# Patient Record
Sex: MALE | Race: WHITE | NOT HISPANIC OR LATINO | Employment: PART TIME | URBAN - METROPOLITAN AREA
[De-identification: names, ages, dates, MRNs, and addresses within clinical notes are randomized per-mention and may not be internally consistent; named-entity substitution may affect disease eponyms.]

---

## 2020-08-24 ENCOUNTER — AMBULATORY - HEALTHEAST (OUTPATIENT)
Dept: INTERNAL MEDICINE | Facility: CLINIC | Age: 25
End: 2020-08-24

## 2020-08-24 ENCOUNTER — VIRTUAL VISIT (OUTPATIENT)
Dept: FAMILY MEDICINE | Facility: OTHER | Age: 25
End: 2020-08-24
Payer: COMMERCIAL

## 2020-08-24 DIAGNOSIS — Z20.822 SUSPECTED 2019 NOVEL CORONAVIRUS INFECTION: ICD-10-CM

## 2020-08-24 PROCEDURE — 99421 OL DIG E/M SVC 5-10 MIN: CPT | Performed by: PHYSICIAN ASSISTANT

## 2020-08-24 NOTE — PROGRESS NOTES
"Date: 2020 12:05:39  Clinician: Milton Woods  Clinician NPI: [id number]  Patient: farhan peña  Patient : 1995  Patient Address: [address] [place] [address]  Patient Phone: [phone]  Visit Protocol: URI  Patient Summary:  farhan is a 25 year old ( : 1995 ) male who initiated a Visit for COVID-19 (Coronavirus) evaluation and screening. When asked the question \"Please sign me up to receive news, health information and promotions from [employer].\", farhan responded \"No\".    When asked when his symptoms started, farhan reported that he does not have any symptoms.   He denies having recent facial or sinus surgery in the past 60 days and taking antibiotic medication in the past month.    Pertinent COVID-19 (Coronavirus) information  In the past 14 days, farhan has not worked in a congregate living setting.   He does not work or volunteer as healthcare worker or a  and does not work or volunteer in a healthcare facility.   farhan also has not lived in a congregate living setting in the past 14 days. He does not live with a healthcare worker.   farhan has had a close contact with a laboratory-confirmed COVID-19 patient in the last 14 days. Additional information about contact with COVID-19 (Coronavirus) patient as reported by the patient (free text): Neli Stevenson lives in same house same room as myself and daughter everyday    Patient reported they are living in the same household with a COVID-19 positive patient.  Since 2019, farhan and has not had upper respiratory infection or influenza-like illness. Has not been diagnosed with lab-confirmed COVID-19 test   Pertinent medical history  farhan needs a return to work/school note.   Weight: 300 lbs   farhan does not smoke or use smokeless tobacco.   Weight: 300 lbs    MEDICATIONS: No current medications, ALLERGIES: NKDA  Clinician Response:  Dear farhan,   Your symptoms show that you may have coronavirus " "(COVID-19). This illness can cause fever, cough and trouble breathing. Many people get a mild case and get better on their own. Some people can get very sick.  What should I do?  We would like to test you for this virus.   1. Please call [phone] to schedule your visit. Explain that you were referred by [facility] to have a COVID-19 test. Be ready to share your [facility] visit ID number.  The following will serve as your written order for this COVID Test, ordered by me, for the indication of suspected COVID [Z20.828]: The test will be ordered in [email or web address], our electronic health record, after you are scheduled. It will show as ordered and authorized by Vik Haro MD.  Order: COVID-19 (Coronavirus) PCR for SYMPTOMATIC testing from [facility].      2. When it's time for your COVID test:  Stay at least 6 feet away from others. (If someone will drive you to your test, stay in the backseat, as far away from the  as you can.)   Cover your mouth and nose with a mask, tissue or washcloth.  Go straight to the testing site. Don't make any stops on the way there or back.      3.Starting now: Stay home and away from others (self-isolate) until:   You've had no fever---and no medicine that reduces fever---for one full day (24 hours). And...   Your other symptoms have gotten better. For example, your cough or breathing has improved. And...   At least 10 days have passed since your symptoms started.       During this time, don't leave the house except for testing or medical care.   Stay in your own room, even for meals. Use your own bathroom if you can.   Stay away from others in your home. No hugging, kissing or shaking hands. No visitors.  Don't go to work, school or anywhere else.    Clean \"high touch\" surfaces often (doorknobs, counters, handles, etc.). Use a household cleaning spray or wipes. You'll find a full list of  on the EPA website: www.epa.gov/pesticide-registration/list-n-disinfectants-use-against-sars-cov-2.   " Cover your mouth and nose with a mask, tissue or washcloth to avoid spreading germs.  Wash your hands and face often. Use soap and water.  Caregivers in these groups are at risk for severe illness due to COVID-19:  o People 65 years and older  o People who live in a nursing home or long-term care facility  o People with chronic disease (lung, heart, cancer, diabetes, kidney, liver, immunologic)  o People who have a weakened immune system, including those who:   Are in cancer treatment  Take medicine that weakens the immune system, such as corticosteroids  Had a bone marrow or organ transplant  Have an immune deficiency  Have poorly controlled HIV or AIDS  Are obese (body mass index of 40 or higher)  Smoke regularly   o Caregivers should wear gloves while washing dishes, handling laundry and cleaning bedrooms and bathrooms.  o Use caution when washing and drying laundry: Don't shake dirty laundry, and use the warmest water setting that you can.  o For more tips, go to www.cdc.gov/coronavirus/2019-ncov/downloads/10Things.pdf.    4.Sign up for [employer]. We know it's scary to hear that you might have COVID-19. We want to track your symptoms to make sure you're okay over the next 2 weeks. Please look for an email from [employer]---this is a free, online program that we'll use to keep in touch. To sign up, follow the link in the email. Learn more at [email or web address]  How can I take care of myself?   Get lots of rest. Drink extra fluids (unless a doctor has told you not to).   Take Tylenol (acetaminophen) for fever or pain. If you have liver or kidney problems, ask your family doctor if it's okay to take Tylenol.   Adults can take either:    650 mg (two 325 mg pills) every 4 to 6 hours, or...   1,000 mg (two 500 mg pills) every 8 hours as needed.    Note: Don't take more than 3,000 mg in one day. Acetaminophen is found in many medicines (both prescribed and over-the-counter medicines). Read all  labels to be sure you don't take too much.   For children, check the Tylenol bottle for the right dose. The dose is based on the child's age or weight.    If you have other health problems (like cancer, heart failure, an organ transplant or severe kidney disease): Call your specialty clinic if you don't feel better in the next 2 days.       Know when to call 911. Emergency warning signs include:    Trouble breathing or shortness of breath Pain or pressure in the chest that doesn't go away Feeling confused like you haven't felt before, or not being able to wake up Bluish-colored lips or face.  Where can I get more information?   [facility] -- About COVID-19: [email or web address]   CDC -- What to Do If You're Sick: www.cdc.gov/coronavirus/2019-ncov/about/steps-when-sick.html   [facility] -- Ending Home Isolation: www.cdc.gov/coronavirus/2019-ncov/hcp/disposition-in-home-patients.html   [facility] -- Caring for Someone: www.cdc.gov/coronavirus/2019-ncov/if-you-are-sick/care-for-someone.html   [facility] -- Interim Guidance for Hospital Discharge to Home: www.[facility].mn.us/diseases/coronavirus/hcp/hospdischarge.pdf   [facility] clinical trials (COVID-19 research studies): clinicalaffairs.[facility]-clinical-trials    Below are the COVID-19 hotlines at the [facility] ([facility]). Interpreters are available.    For health questions: Call 541-494-0356 or 1-131.199.4310 (7 a.m. to 7 p.m.) For questions about schools and childcare: Call 830-479-1906 or 1-485.816.2700 (7 a.m. to 7 p.m.)    Diagnosis: Contact with and (suspected) exposure to other viral communicable diseases  Diagnosis ICD: Z20.828

## 2020-08-25 ENCOUNTER — AMBULATORY - HEALTHEAST (OUTPATIENT)
Dept: FAMILY MEDICINE | Facility: CLINIC | Age: 25
End: 2020-08-25

## 2020-08-25 DIAGNOSIS — Z20.822 SUSPECTED 2019 NOVEL CORONAVIRUS INFECTION: ICD-10-CM

## 2020-08-27 ENCOUNTER — COMMUNICATION - HEALTHEAST (OUTPATIENT)
Dept: SCHEDULING | Facility: CLINIC | Age: 25
End: 2020-08-27

## 2021-08-15 ENCOUNTER — HEALTH MAINTENANCE LETTER (OUTPATIENT)
Age: 26
End: 2021-08-15

## 2021-10-11 ENCOUNTER — HEALTH MAINTENANCE LETTER (OUTPATIENT)
Age: 26
End: 2021-10-11

## 2022-06-13 ENCOUNTER — HOSPITAL ENCOUNTER (EMERGENCY)
Facility: CLINIC | Age: 27
Discharge: LEFT BEFORE DISCHARGE | End: 2022-06-13
Admitting: EMERGENCY MEDICINE
Payer: COMMERCIAL

## 2022-06-13 VITALS
WEIGHT: 300 LBS | HEART RATE: 66 BPM | TEMPERATURE: 97.8 F | SYSTOLIC BLOOD PRESSURE: 166 MMHG | BODY MASS INDEX: 36.53 KG/M2 | DIASTOLIC BLOOD PRESSURE: 105 MMHG | RESPIRATION RATE: 16 BRPM | OXYGEN SATURATION: 98 % | HEIGHT: 76 IN

## 2022-06-13 LAB
ALBUMIN SERPL-MCNC: 4.5 G/DL (ref 3.5–5)
ALP SERPL-CCNC: 79 U/L (ref 45–120)
ALT SERPL W P-5'-P-CCNC: 30 U/L (ref 0–45)
ANION GAP SERPL CALCULATED.3IONS-SCNC: 11 MMOL/L (ref 5–18)
AST SERPL W P-5'-P-CCNC: 24 U/L (ref 0–40)
ATRIAL RATE - MUSE: 76 BPM
BASOPHILS # BLD AUTO: 0 10E3/UL (ref 0–0.2)
BASOPHILS NFR BLD AUTO: 0 %
BILIRUB DIRECT SERPL-MCNC: 0.2 MG/DL
BILIRUB SERPL-MCNC: 0.8 MG/DL (ref 0–1)
BUN SERPL-MCNC: 12 MG/DL (ref 8–22)
CALCIUM SERPL-MCNC: 9.8 MG/DL (ref 8.5–10.5)
CHLORIDE BLD-SCNC: 107 MMOL/L (ref 98–107)
CO2 SERPL-SCNC: 22 MMOL/L (ref 22–31)
CREAT SERPL-MCNC: 0.76 MG/DL (ref 0.7–1.3)
DIASTOLIC BLOOD PRESSURE - MUSE: NORMAL MMHG
EOSINOPHIL # BLD AUTO: 0 10E3/UL (ref 0–0.7)
EOSINOPHIL NFR BLD AUTO: 0 %
ERYTHROCYTE [DISTWIDTH] IN BLOOD BY AUTOMATED COUNT: 12.8 % (ref 10–15)
GFR SERPL CREATININE-BSD FRML MDRD: >90 ML/MIN/1.73M2
GLUCOSE BLD-MCNC: 100 MG/DL (ref 70–125)
HCT VFR BLD AUTO: 46.6 % (ref 40–53)
HGB BLD-MCNC: 15.8 G/DL (ref 13.3–17.7)
HOLD SPECIMEN: NORMAL
IMM GRANULOCYTES # BLD: 0.1 10E3/UL
IMM GRANULOCYTES NFR BLD: 1 %
INTERPRETATION ECG - MUSE: NORMAL
LIPASE SERPL-CCNC: 11 U/L (ref 0–52)
LYMPHOCYTES # BLD AUTO: 2 10E3/UL (ref 0.8–5.3)
LYMPHOCYTES NFR BLD AUTO: 27 %
MCH RBC QN AUTO: 27.9 PG (ref 26.5–33)
MCHC RBC AUTO-ENTMCNC: 33.9 G/DL (ref 31.5–36.5)
MCV RBC AUTO: 82 FL (ref 78–100)
MONOCYTES # BLD AUTO: 0.5 10E3/UL (ref 0–1.3)
MONOCYTES NFR BLD AUTO: 6 %
NEUTROPHILS # BLD AUTO: 4.7 10E3/UL (ref 1.6–8.3)
NEUTROPHILS NFR BLD AUTO: 66 %
NRBC # BLD AUTO: 0 10E3/UL
NRBC BLD AUTO-RTO: 0 /100
P AXIS - MUSE: 66 DEGREES
PLATELET # BLD AUTO: 252 10E3/UL (ref 150–450)
POTASSIUM BLD-SCNC: 4.1 MMOL/L (ref 3.5–5)
PR INTERVAL - MUSE: 168 MS
PROT SERPL-MCNC: 8.7 G/DL (ref 6–8)
QRS DURATION - MUSE: 100 MS
QT - MUSE: 382 MS
QTC - MUSE: 429 MS
R AXIS - MUSE: 23 DEGREES
RBC # BLD AUTO: 5.66 10E6/UL (ref 4.4–5.9)
SODIUM SERPL-SCNC: 140 MMOL/L (ref 136–145)
SYSTOLIC BLOOD PRESSURE - MUSE: NORMAL MMHG
T AXIS - MUSE: 59 DEGREES
TROPONIN I SERPL-MCNC: <0.01 NG/ML (ref 0–0.29)
VENTRICULAR RATE- MUSE: 76 BPM
WBC # BLD AUTO: 7.3 10E3/UL (ref 4–11)

## 2022-06-13 PROCEDURE — 36415 COLL VENOUS BLD VENIPUNCTURE: CPT | Performed by: EMERGENCY MEDICINE

## 2022-06-13 PROCEDURE — 85025 COMPLETE CBC W/AUTO DIFF WBC: CPT | Performed by: EMERGENCY MEDICINE

## 2022-06-13 PROCEDURE — 84484 ASSAY OF TROPONIN QUANT: CPT | Performed by: EMERGENCY MEDICINE

## 2022-06-13 PROCEDURE — 83690 ASSAY OF LIPASE: CPT | Performed by: EMERGENCY MEDICINE

## 2022-06-13 PROCEDURE — 93005 ELECTROCARDIOGRAM TRACING: CPT | Performed by: EMERGENCY MEDICINE

## 2022-06-13 PROCEDURE — 999N000104 HC STATISTIC NO CHARGE

## 2022-06-13 PROCEDURE — 82248 BILIRUBIN DIRECT: CPT | Performed by: EMERGENCY MEDICINE

## 2022-06-13 NOTE — ED NOTES
Patient needs to leave. Patient will be able to access his results through [employer]. Patient does not wish to wait for a provider.

## 2022-06-13 NOTE — ED TRIAGE NOTES
The patient presents to the ED with c/o vomiting and right-sided flank pain. Is also c/o chest pain. Denies shortness of breath. Tested COVID19 positive two days ago.

## 2022-09-24 ENCOUNTER — HEALTH MAINTENANCE LETTER (OUTPATIENT)
Age: 27
End: 2022-09-24

## 2023-10-14 ENCOUNTER — HEALTH MAINTENANCE LETTER (OUTPATIENT)
Age: 28
End: 2023-10-14

## 2024-06-16 ENCOUNTER — HOSPITAL ENCOUNTER (EMERGENCY)
Facility: CLINIC | Age: 29
Discharge: HOME | End: 2024-06-16
Payer: MEDICAID

## 2024-06-16 VITALS
BODY MASS INDEX: 42.66 KG/M2 | SYSTOLIC BLOOD PRESSURE: 109 MMHG | RESPIRATION RATE: 16 BRPM | OXYGEN SATURATION: 94 % | HEART RATE: 82 BPM | TEMPERATURE: 98 F | WEIGHT: 315 LBS | DIASTOLIC BLOOD PRESSURE: 63 MMHG | HEIGHT: 72 IN

## 2024-06-16 DIAGNOSIS — S51.811A LACERATION OF RIGHT FOREARM, INITIAL ENCOUNTER: ICD-10-CM

## 2024-06-16 PROCEDURE — 250N000009 HC RX 250

## 2024-06-16 PROCEDURE — 90471 IMMUNIZATION ADMIN: CPT

## 2024-06-16 PROCEDURE — 90715 TDAP VACCINE 7 YRS/> IM: CPT

## 2024-06-16 PROCEDURE — 250N000011 HC RX IP 250 OP 636

## 2024-06-16 PROCEDURE — 12001 RPR S/N/AX/GEN/TRNK 2.5CM/<: CPT

## 2024-06-16 PROCEDURE — 99283 EMERGENCY DEPT VISIT LOW MDM: CPT | Mod: 25

## 2024-06-16 RX ORDER — GINSENG 100 MG
CAPSULE ORAL ONCE
Status: COMPLETED | OUTPATIENT
Start: 2024-06-16 | End: 2024-06-16

## 2024-06-16 RX ADMIN — CLOSTRIDIUM TETANI TOXOID ANTIGEN (FORMALDEHYDE INACTIVATED), CORYNEBACTERIUM DIPHTHERIAE TOXOID ANTIGEN (FORMALDEHYDE INACTIVATED), BORDETELLA PERTUSSIS TOXOID ANTIGEN (GLUTARALDEHYDE INACTIVATED), BORDETELLA PERTUSSIS FILAMENTOUS HEMAGGLUTININ ANTIGEN (FORMALDEHYDE INACTIVATED), BORDETELLA PERTUSSIS PERTACTIN ANTIGEN, AND BORDETELLA PERTUSSIS FIMBRIAE 2/3 ANTIGEN 0.5 ML: 5; 2; 2.5; 5; 3; 5 INJECTION, SUSPENSION INTRAMUSCULAR at 21:49

## 2024-06-16 RX ADMIN — BACITRACIN: 500 OINTMENT TOPICAL at 22:40

## 2024-06-16 ASSESSMENT — COLUMBIA-SUICIDE SEVERITY RATING SCALE - C-SSRS
6. HAVE YOU EVER DONE ANYTHING, STARTED TO DO ANYTHING, OR PREPARED TO DO ANYTHING TO END YOUR LIFE?: NO
1. IN THE PAST MONTH, HAVE YOU WISHED YOU WERE DEAD OR WISHED YOU COULD GO TO SLEEP AND NOT WAKE UP?: NO
2. HAVE YOU ACTUALLY HAD ANY THOUGHTS OF KILLING YOURSELF IN THE PAST MONTH?: NO

## 2024-06-16 ASSESSMENT — ACTIVITIES OF DAILY LIVING (ADL): ADLS_ACUITY_SCORE: 35

## 2024-06-17 NOTE — ED TRIAGE NOTES
Pt presents to the ED with c/o laceration to right forearm from a saw falling off shelf landing on right arm.      Triage Assessment (Adult)       Row Name 06/16/24 1614          Triage Assessment    Airway WDL WDL        Respiratory WDL    Respiratory WDL WDL        Skin Circulation/Temperature WDL    Skin Circulation/Temperature WDL WDL        Cardiac WDL    Cardiac WDL WDL        Peripheral/Neurovascular WDL    Peripheral Neurovascular WDL WDL        Cognitive/Neuro/Behavioral WDL    Cognitive/Neuro/Behavioral WDL WDL

## 2024-06-17 NOTE — ED PROVIDER NOTES
EMERGENCY DEPARTMENT ENCOUNTER      NAME: Gavin Lancaster  AGE: 28 year old male  YOB: 1995  MRN: [id number]  EVALUATION DATE & TIME: 2024  9:40 PM    PCP: No Ref-Primary, Physician    ED PROVIDER: Phoebe Goss PA-C      Chief Complaint   Patient presents with    Laceration         FINAL IMPRESSION:  1. Laceration of right forearm, initial encounter          ED COURSE & MEDICAL DECISION MAKIN:56 PM Met with patient for initial interview. Plan for care discussed.  10:09 PM Reevaluated and updated patient. Laceration repair performed. I discussed the plan for discharge with the patient, and patient is agreeable. We discussed supportive cares at home and reasons for return to the ER including new or worsening symptoms. All questions and concerns addressed. Patient to be discharged by RN.    28 year old male presents to the Emergency Department for evaluation of laceration after saw fell onto his forearm from shelf. Upon exam, patient is afebrile, hypertensive, but in no acute distress. Superficial laceration to right forearm as pictured below. Neurovascularly intact distally. No obvious foreign bodies visualized. Discussed option of XR, which patient ultimately defers. Given no foreign bodies and no bony tenderness to palpation, I feel this is appropriate.     Tetanus updated in ED today. Based on the presenting symptoms, the wound was irrigated, explored, and closed with 6 sutures as documented below. Patient was educated on signs of infection including redness, drainage, warmth, fever/chills with instructions to return immediately if infection suspected or new weakness/numbness/tingling, decreased ROM, or cold extremity. Patient also educated to keep the wound clean and dry for the next 24 hours. Patient advised to set up an appointment with PCP in 10 days for suture removal - referral placed today. The patient was advised to return to the ER if new or worsening symptoms develop. The  patient was stable and well appearing throughout entire ER visit and upon discharge. The patient verbalizes understanding and agrees with the plan.    Medical Decision Making  Obtained supplemental history:Supplemental history obtained?: Family Member/Significant Other  Reviewed external records: External records reviewed?: No  Care impacted by chronic illness:N/A  Care significantly affected by social determinants of health:N/A  Did you consider but not order tests?: Work up considered but not performed and documented in chart, if applicable  Did you interpret images independently?: Independent interpretation of ECG and images noted in documentation, when applicable.  Consultation discussion with other provider:Did you involve another provider (consultant, , pharmacy, etc.)?: No  Discharge. No recommendations on prescription strength medication(s). See documentation for any additional details.    MEDICATIONS GIVEN IN THE EMERGENCY:  Medications   Tdap (tetanus-diphtheria-acell pertussis) (ADACEL) injection 0.5 mL (0.5 mLs Intramuscular $Given 6/16/24 3002)   bacitracin ointment ( Topical $Given 6/16/24 4191)       NEW PRESCRIPTIONS STARTED AT TODAY'S ER VISIT  New Prescriptions    No medications on file          =================================================================    HPI    Patient information was obtained from: Patient     Use of : N/A        Gavin Lancaster is a 28 year old male with no pertinent history on file who presents to this ED by walk in for evaluation of laceration.    Patient reports that a saw fell off of the top shelf and lacerated his left forearm. He had some bleeding and endorses mild pain. He denies concerns for foreign bodies. No numbness, weakness, tingling.     Per [facility], last Tdap was on 02/06/2019      REVIEW OF SYSTEMS   As per HPI    PAST MEDICAL HISTORY:  No past medical history on file.    PAST SURGICAL HISTORY:  No past surgical history on file.        CURRENT  MEDICATIONS:    No current outpatient medications on file.      ALLERGIES:  No Known Allergies    FAMILY HISTORY:  No family history on file.    SOCIAL HISTORY:   Social History     Socioeconomic History    Marital status: Single       VITALS:  BP (!) 162/87   Pulse 98   Temp 98  F (36.7  C) (Temporal)   Resp 16   Ht 1.829 m (6')   Wt (!) 158.8 kg (350 lb)   SpO2 96%   BMI 47.47 kg/m      PHYSICAL EXAM    Constitutional:  Alert, in no acute distress. Cooperative.  EYES: Conjunctivae clear.  HENT:  Atraumatic, normocephalic.  Respiratory:  Respirations even, unlabored, in no acute respiratory distress.  Cardiovascular:  Regular rate, good peripheral perfusion.  GI: Soft, flat, non-distended.  Musculoskeletal: Right upper extremity: Superficial laceration to right forearm as pictured below. Bleeding well controlled with direct pressure. No surrounding erythema, warmth, purulence, fluctuance, swelling, crepitus, or obvious bony deformity. No obvious foreign body visualized. No nail or tendon involvement. Full ROM without pain. Neurovascularly intact distally. Cap refill <2 seconds. 5/5 strength. Surrounding compartments soft.  Integument: Warm, Dry.   Neurologic:  Alert & oriented. No focal deficits noted.  Psych: Normal mood and affect.        LAB:  All pertinent labs reviewed and interpreted.       RADIOLOGY:  Reviewed all pertinent imaging. Please see official radiology report.  No orders to display     PROCEDURES:   PROCEDURE: Laceration Repair   INDICATIONS: Laceration   PROCEDURE PROVIDER: Phoebe Goss PA-C   SITE: Right forearm   TYPE/SIZE: simple, superficial, clean, and no foreign body visualized  2.0 cm (total length)   FUNCTIONAL ASSESSMENT: Distal sensation, circulation, motor, and tendon function intact   MEDICATION: 2 mLs of 1% Lidocaine with epinephrine   PREPARATION: irrigation with Normal saline   DEBRIDEMENT: wound explored, no foreign body found   CLOSURE:  Superficial layer closed with 6  stitches of 4-0 Ethilon simple interrupted    Total number of sutures/staples placed: 6     I, Dagoberto Sanchez, am serving as a scribe to document services personally performed by Phoebe Goss PA-C based on my observation and the provider's statements to me. I, Phoebe Goss PA-C, attest that Dagoberto Sanchez is acting in a scribe capacity, has observed my performance of the services and has documented them in accordance with my direction.    Phoebe Goss PA-C  [facility] EMERGENCY ROOM  [id number] [facility] [id number] [phone]        Phoebe Goss PA-C  06/16/24 2295

## 2024-06-17 NOTE — DISCHARGE INSTRUCTIONS
You were seen in the ER for evaluation of laceration. Your laceration was cleaned and repaired with 6 sutures. Your tetanus was updated today.    Rest, ice, elevate your extremity, Tylenol and/or ibuprofen as needed for pain. Keep your bandage in place and clean and dry for the first 24 hours, then only clean water - no dirty water (swimming pools, hot tubes, saunas, lakes, etc.) until your sutures are removed.     Tylenol (Acetaminophen) Discharge Instructions:  You may take 2 tablets of regular strength, over-the-counter, Tylenol (acetaminophen) every 4-6 hours as needed for pain.  Take no more than 4000 mg of Tylenol in a 24-hour period.      Avoid taking more than 1 acetaminophen-containing product at a time and be aware that many over-the-counter medications contain a combination of acetaminophen and other products.  If you are taking Tylenol in addition to a combination product please keep track of your daily acetaminophen dose to make sure you do not exceed the recommended 4000 mg.  Taking too much acetaminophen can cause permanent damage to your liver.    Ibuprofen/Naproxen Discharge Instructions:  You may take ibuprofen for pain control.  The maximum dose of (ibuprofen is 3200 mg ) in a 24-hour period.    Take this medication with food to prevent stomach irritation.  With long-term use this medication can irritate the stomach causing pain and lead to development of a stomach ulcer.  If you notice stomach pain or vomiting of coffee-ground colored vomit or blood, please be seen by a healthcare provider.  Attempt to use this medication for the shortest time possible.      Follow-up with your primary care provider in 10 days for reevaluation and suture removal.     Return to the emergency department for any new or worsening symptoms including worsening pain, redness/warmth/drainage/swelling, streaking redness up your extremity, fever/chills, new weakness/numbness/tingling, decreased range of motion, cool  extremity, or any other concerning symptoms.    Take Care!  - Phoebe Goss PA-C

## 2024-06-17 NOTE — ED NOTES
Bacitracin, nonstick and Kerlix applied to wound. Discharge education provided, pt verbalized understanding

## 2024-06-25 ENCOUNTER — OFFICE VISIT (OUTPATIENT)
Dept: FAMILY MEDICINE | Facility: CLINIC | Age: 29
End: 2024-06-25

## 2024-06-25 VITALS
TEMPERATURE: 98.7 F | OXYGEN SATURATION: 95 % | WEIGHT: 315 LBS | DIASTOLIC BLOOD PRESSURE: 78 MMHG | RESPIRATION RATE: 16 BRPM | HEIGHT: 76 IN | SYSTOLIC BLOOD PRESSURE: 130 MMHG | HEART RATE: 78 BPM | BODY MASS INDEX: 38.36 KG/M2

## 2024-06-25 DIAGNOSIS — S51.811D LACERATION OF RIGHT FOREARM, SUBSEQUENT ENCOUNTER: Primary | ICD-10-CM

## 2024-06-25 PROBLEM — J30.2 SEASONAL ALLERGIC RHINITIS: Status: ACTIVE | Noted: 2018-05-17

## 2024-06-25 PROCEDURE — 99207 PR NO CHARGE NURSE ONLY: CPT | Performed by: STUDENT IN AN ORGANIZED HEALTH CARE EDUCATION/TRAINING PROGRAM

## 2024-06-25 NOTE — PROGRESS NOTES
"  Assessment & Plan     Laceration of right forearm, subsequent encounter  Gavin is a 28-year-old male presents today for follow-up of right forearm laceration occurring on June 16.  The laceration was caused by a saw which fell on his arm, causing a simple and clean 2 cm laceration on the dorsal right forearm.  In the emergency room 6 simple interrupted sutures were placed with 4-0 Ethilon and tetanus shot was given.  His prior last tetanus vaccination was 2019.    Examined today, appears to be healing well, sutures removed, no dehiscence but slight separation of superficial epidermal layer of skin in the middle of the laceration, applied Steri-Strips to the area to promote closure, he should keep these on until they fall off, no need for follow-up..    - REMOVAL OF SUTURES          MED REC REQUIRED  Post Medication Reconciliation Status: discharge medications reconciled, continue medications without change  Nicotine/Tobacco Cessation  He reports that he has been smoking. He has never been exposed to tobacco smoke. He has never used smokeless tobacco.  Nicotine/Tobacco Cessation Plan  Information offered: Patient not interested at this time      BMI  Estimated body mass index is 42.75 kg/m  as calculated from the following:    Height as of this encounter: 1.93 m (6' 4\").    Weight as of this encounter: 159.3 kg (351 lb 3.2 oz).   Weight management plan: Discussed healthy diet and exercise guidelines      Follow-up with PCM for preventative care    Subjective   Gavin is a 28 year old, presenting for the following health issues:  Hospital F/U (06/16/2024, Laceration of right forearm. Stitches removal.)        6/25/2024     8:45 AM   Additional Questions   Roomed by LUZ LOPEZ   Accompanied by Spouse     PAUL         On June 16, 2024 he was putting away camping supplies on a jigsaw fell onto his right forearm, did not cause significant pain but he a laceration that is about 2 cm in length, he went to the emergency room " "where they placed 6 simple interrupted sutures with 4-0 Ethilon.  Reports has been healing well, no redness, no discharge, denies fevers.  Feeling good health.      Review of Systems  Constitutional, neuro, ENT, endocrine, pulmonary, cardiac, gastrointestinal, genitourinary, musculoskeletal, integument and psychiatric systems are negative, except as otherwise noted.      Objective    /78 (BP Location: Right arm, Patient Position: Sitting, Cuff Size: Adult Large)   Pulse 78   Temp 98.7  F (37.1  C) (Oral)   Resp 16   Ht 1.93 m (6' 4\")   Wt (!) 159.3 kg (351 lb 3.2 oz)   SpO2 95%   BMI 42.75 kg/m    Body mass index is 42.75 kg/m .  Physical Exam   GENERAL: alert and no distress  MS: no gross musculoskeletal defects noted, no edema  SKIN: 6 simple interrupted sutures present on the right dorsal forearm, well-healing 2 cm laceration.  No suspicious lesions or rashes    Admission on 06/13/2022, Discharged on 06/13/2022   Component Date Value Ref Range Status    Ventricular Rate 06/13/2022 76  BPM Final    Atrial Rate 06/13/2022 76  BPM Final    UT Interval 06/13/2022 168  ms Final    QRS Duration 06/13/2022 100  ms Final    QT 06/13/2022 382  ms Final    QTc 06/13/2022 429  ms Final    P Axis 06/13/2022 66  degrees Final    R AXIS 06/13/2022 23  degrees Final    T Axis 06/13/2022 59  degrees Final    Interpretation ECG 06/13/2022    Final                    Value:Sinus rhythm  Normal ECG  No previous ECGs available  Confirmed by SEE ED PROVIDER NOTE FOR, ECG INTERPRETATION (4000),  VISHAL MARTINEZ ([id number]) on 6/13/2022 8:34:05 PM      Hold Specimen 06/13/2022 [facility]   Final    Hold Specimen 06/13/2022 [facility]   Final    Hold Specimen 06/13/2022 [facility]   Final    Hold Specimen 06/13/2022 JI   Final    Sodium 06/13/2022 140  136 - 145 mmol/L Final    Potassium 06/13/2022 4.1  3.5 - 5.0 mmol/L Final    Chloride 06/13/2022 107  98 - 107 mmol/L Final    Carbon Dioxide (CO2) 06/13/2022 22  22 - 31 mmol/L Final    " Anion Gap 06/13/2022 11  5 - 18 mmol/L Final    Urea Nitrogen 06/13/2022 12  8 - 22 mg/dL Final    Creatinine 06/13/2022 0.76  0.70 - 1.30 mg/dL Final    Calcium 06/13/2022 9.8  8.5 - 10.5 mg/dL Final    Glucose 06/13/2022 100  70 - 125 mg/dL Final    GFR Estimate 06/13/2022 >90  >60 mL/min/1.73m2 Final    Effective December 21, 2021 eGFRcr in adults is calculated using the 2021 CKD-EPI creatinine equation which includes age and gender (Socorro et al., [place], DOI: 10.[facility]6/VTTFzn8204301)    Troponin I 06/13/2022 <0.01  0.00 - 0.29 ng/mL Final    Bilirubin Total 06/13/2022 0.8  0.0 - 1.0 mg/dL Final    Bilirubin Direct 06/13/2022 0.2  <=0.5 mg/dL Final    Protein Total 06/13/2022 8.7 (H)  6.0 - 8.0 g/dL Final    Albumin 06/13/2022 4.5  3.5 - 5.0 g/dL Final    Alkaline Phosphatase 06/13/2022 79  45 - 120 U/L Final    AST 06/13/2022 24  0 - 40 U/L Final    ALT 06/13/2022 30  0 - 45 U/L Final    Lipase 06/13/2022 11  0 - 52 U/L Final    WBC Count 06/13/2022 7.3  4.0 - 11.0 10e3/uL Final    RBC Count 06/13/2022 5.66  4.40 - 5.90 10e6/uL Final    Hemoglobin 06/13/2022 15.8  13.3 - 17.7 g/dL Final    Hematocrit 06/13/2022 46.6  40.0 - 53.0 % Final    MCV 06/13/2022 82  78 - 100 fL Final    MCH 06/13/2022 27.9  26.5 - 33.0 pg Final    MCHC 06/13/2022 33.9  31.5 - 36.5 g/dL Final    RDW 06/13/2022 12.8  10.0 - 15.0 % Final    Platelet Count 06/13/2022 252  150 - 450 10e3/uL Final    % Neutrophils 06/13/2022 66  % Final    % Lymphocytes 06/13/2022 27  % Final    % Monocytes 06/13/2022 6  % Final    % Eosinophils 06/13/2022 0  % Final    % Basophils 06/13/2022 0  % Final    % Immature Granulocytes 06/13/2022 1  % Final    NRBCs per 100 WBC 06/13/2022 0  <1 /100 Final    Absolute Neutrophils 06/13/2022 4.7  1.6 - 8.3 10e3/uL Final    Absolute Lymphocytes 06/13/2022 2.0  0.8 - 5.3 10e3/uL Final    Absolute Monocytes 06/13/2022 0.5  0.0 - 1.3 10e3/uL Final    Absolute Eosinophils 06/13/2022 0.0  0.0 - 0.7 10e3/uL Final    Absolute  Basophils 06/13/2022 0.0  0.0 - 0.2 10e3/uL Final    Absolute Immature Granulocytes 06/13/2022 0.1  <=0.4 10e3/uL Final    Absolute NRBCs 06/13/2022 0.0  10e3/uL Final           Signed Electronically by: Yomi Tan MD

## 2024-12-01 ENCOUNTER — HEALTH MAINTENANCE LETTER (OUTPATIENT)
Age: 29
End: 2024-12-01